# Patient Record
Sex: MALE | Race: OTHER | NOT HISPANIC OR LATINO | Employment: OTHER | URBAN - METROPOLITAN AREA
[De-identification: names, ages, dates, MRNs, and addresses within clinical notes are randomized per-mention and may not be internally consistent; named-entity substitution may affect disease eponyms.]

---

## 2023-11-06 PROBLEM — E78.5 DYSLIPIDEMIA: Status: ACTIVE | Noted: 2023-11-06

## 2023-11-06 PROBLEM — B02.29 POST HERPETIC NEURALGIA: Status: ACTIVE | Noted: 2023-11-06

## 2023-11-06 PROBLEM — I48.21 PERMANENT ATRIAL FIBRILLATION (MULTI): Status: ACTIVE | Noted: 2023-11-06

## 2023-11-06 PROBLEM — I10 HYPERTENSION: Status: ACTIVE | Noted: 2023-11-06

## 2023-11-06 PROBLEM — R52 PAIN OF LEFT SIDE OF BODY: Status: ACTIVE | Noted: 2023-11-06

## 2023-11-06 PROBLEM — R42 LIGHTHEADEDNESS: Status: ACTIVE | Noted: 2023-11-06

## 2023-11-06 PROBLEM — G25.2 ORTHOSTATIC TREMOR: Status: ACTIVE | Noted: 2023-11-06

## 2023-11-06 PROBLEM — R42 VERTIGO: Status: ACTIVE | Noted: 2023-11-06

## 2023-11-06 PROBLEM — R42 DIZZINESS: Status: ACTIVE | Noted: 2023-11-06

## 2023-11-06 RX ORDER — DULOXETIN HYDROCHLORIDE 20 MG/1
1 CAPSULE, DELAYED RELEASE ORAL NIGHTLY
COMMUNITY
Start: 2020-06-01

## 2023-11-06 RX ORDER — WARFARIN SODIUM 5 MG/1
TABLET ORAL
COMMUNITY

## 2023-11-06 RX ORDER — LOVASTATIN 20 MG/1
1 TABLET ORAL DAILY
COMMUNITY
Start: 2015-10-28

## 2023-11-06 RX ORDER — LISINOPRIL 5 MG/1
20 TABLET ORAL DAILY
COMMUNITY
Start: 2020-03-13 | End: 2023-11-13

## 2023-11-11 PROBLEM — Z79.01 ANTICOAGULANT LONG-TERM USE: Status: ACTIVE | Noted: 2023-11-11

## 2023-11-11 PROBLEM — E66.3 OVERWEIGHT WITH BODY MASS INDEX (BMI) 25.0-29.9: Status: ACTIVE | Noted: 2023-11-11

## 2023-11-13 ENCOUNTER — OFFICE VISIT (OUTPATIENT)
Dept: CARDIOLOGY | Facility: CLINIC | Age: 78
End: 2023-11-13
Payer: COMMERCIAL

## 2023-11-13 VITALS
SYSTOLIC BLOOD PRESSURE: 117 MMHG | DIASTOLIC BLOOD PRESSURE: 61 MMHG | HEIGHT: 67 IN | WEIGHT: 176 LBS | BODY MASS INDEX: 27.62 KG/M2 | HEART RATE: 53 BPM

## 2023-11-13 DIAGNOSIS — R00.1 BRADYCARDIA: ICD-10-CM

## 2023-11-13 DIAGNOSIS — I48.21 PERMANENT ATRIAL FIBRILLATION (MULTI): ICD-10-CM

## 2023-11-13 DIAGNOSIS — R06.02 SHORTNESS OF BREATH: ICD-10-CM

## 2023-11-13 DIAGNOSIS — I10 PRIMARY HYPERTENSION: ICD-10-CM

## 2023-11-13 PROCEDURE — 3078F DIAST BP <80 MM HG: CPT | Performed by: INTERNAL MEDICINE

## 2023-11-13 PROCEDURE — 3074F SYST BP LT 130 MM HG: CPT | Performed by: INTERNAL MEDICINE

## 2023-11-13 PROCEDURE — 99214 OFFICE O/P EST MOD 30 MIN: CPT | Performed by: INTERNAL MEDICINE

## 2023-11-13 PROCEDURE — 1159F MED LIST DOCD IN RCRD: CPT | Performed by: INTERNAL MEDICINE

## 2023-11-13 PROCEDURE — 1036F TOBACCO NON-USER: CPT | Performed by: INTERNAL MEDICINE

## 2023-11-13 RX ORDER — LISINOPRIL 20 MG/1
20 TABLET ORAL DAILY
COMMUNITY

## 2023-11-13 RX ORDER — LISINOPRIL 10 MG/1
10 TABLET ORAL 2 TIMES DAILY
COMMUNITY
Start: 2023-11-03 | End: 2023-11-13 | Stop reason: WASHOUT

## 2023-11-13 NOTE — PROGRESS NOTES
Patient:  Ebony Garrido  YOB: 1945  MRN: [id number]       Impression/Plan:   Primary hypertension  -    Although medication dosage has increased 20 mg of lisinopril still reasonably low-dose and blood pressure currently quite well controlled.  I do not think it likely he has secondary hypertension.  -      Occasionally as heart rate starts to slow blood pressure may increase.  It is also possible simply that his he gets older he needs a bit more medicine to control the blood pressure.  For now would continue on lisinopril as he has no adverse effects and blood pressure is controlled    Bradycardia  -     Holter or Event Cardiac Monitor; Future  -     Heart rate on exam is 45-50.  I am concerned at other times it may even be slower.  Holter monitor will be obtained to determine heart rate variability and if there is significant bradycardia occurring.  As noted above occasionally severe bradycardia can lead to elevated blood pressures    Permanent atrial fibrillation (CMS/HCC)  -     Holter or Event Cardiac Monitor; Future  -     Permanent A-fib without thromboembolic phenomenon on anticoagulation which is monitored by primary care    Shortness of breath  -     Usually just when overexerting himself Holter will help determine chronotropic competence and echo to assure no LV dysfunction  -     Transthoracic Echo (TTE) Complete; Future  -     Follow Up In Cardiology; Future      Chief Complaint/Active Symptoms:       Ebony Garrido is a 78 y.o. male who speaks only Slovenian and presents with his son Dr. Garrido, hospitalist at [facility]. Dr. Garrido translates for his father.  He was diagnosed with atrial fibrillation prior to 2014 and has been permanent since that time.  There is a reference in the chart to a TIA some 20 years ago but MRI studies since that time have not shown focal stroke or other CNS abnormalities.  Exercise stress echocardiography January 2020 showed a normal functional capacity of 7.1 METS with  normal echocardiographic and ECG response to exercise. Atrial fibrillation rate with exercise showed a normal increase as well. He has been on long-term warfarin therapy managed closely by primary care and INRs have been consistently within therapeutic range. He has had no prior history of myocardial infarction, stroke or symptoms to suggest peripheral vascular disease.  He has sick sinus syndrome based on atrial fibrillation on a relatively slow side on no negative chronotropic agents.  Also with hyperlipidemia and hypertension.    Was last in this office 5/2/2022 with some noncardiac chest wall discomfort atrial fibrillation with reasonable rate control and well-controlled blood pressure at that time.    In September he had been on vacation and developed a molar abscess in his right lower jaw.  With the increased pain he was noted to have increased blood pressure and gradually increased his lisinopril to maintain good blood pressure control.  It was getting as high as 160-180 mmHg.  He eventually increased his lisinopril to a maximum of 20 a day and has afforded good blood pressure control to date.  He has not felt chest tightness pressure heaviness.  He is not feeling short of breath.  He is fairly active he walks frequently works out in the yard and is very active most of the day without any cardiorespiratory symptoms.  He has had no nocturnal dyspnea.  No coughing.  No dizziness or lightheadedness.  Concern is that all of a sudden he seems to need a higher dose of lisinopril.  Occasionally he may get short of breath if he walks faster than usual but for the most part has been doing fairly well    Lab work done through [facility] last week demonstrates normal liver and renal function.  Normal CBC.           Review of Systems: Unremarkable except as noted above    Meds     Current Outpatient Medications   Medication Instructions    DULoxetine (Cymbalta) 20 mg DR capsule 1 capsule, oral, Nightly    lisinopril  "10 mg, oral, 2 times daily    lovastatin (Mevacor) 20 mg tablet 1 tablet, oral, Daily    warfarin (Coumadin) 5 mg tablet oral        Allergies   No Known Allergies      Annotated Problems     Specialty Problems          Cardiology Problems    Dyslipidemia    Hypertension    Permanent atrial fibrillation (CMS/HCC)    Anticoagulant long-term use        Problem List     Patient Active Problem List    Diagnosis Date Noted    Anticoagulant long-term use 11/11/2023    Overweight with body mass index (BMI) 25.0-29.9 11/11/2023    Dizziness 11/06/2023    Lightheadedness 11/06/2023    Vertigo 11/06/2023    Dyslipidemia 11/06/2023    Hypertension 11/06/2023    Pain of left side of body 11/06/2023    Permanent atrial fibrillation (CMS/HCC) 11/06/2023    Post herpetic neuralgia 11/06/2023    Orthostatic tremor 11/06/2023       Objective:     Vitals:    11/13/23 1052   BP: 117/61   BP Location: Right arm   Patient Position: Sitting   Pulse: 53   Weight: 79.8 kg (176 lb)   Height: 1.702 m (5' 7\")      Wt Readings from Last 4 Encounters:   11/13/23 79.8 kg (176 lb)   05/02/22 82.6 kg (182 lb)           LAB:     Lab Results   Component Value Date    WBC 5.4 01/30/2020    HGB 15.2 01/30/2020    HCT 41.3 01/30/2020     (L) 01/30/2020    CHOL 178 01/30/2020    TRIG 100 01/30/2020    HDL 45.0 01/30/2020    ALT 16 01/30/2020    AST 24 01/30/2020     01/30/2020    K 4.6 01/30/2020     01/30/2020    CREATININE 0.95 01/30/2020    BUN 27 (H) 01/30/2020    CO2 24 01/30/2020    INR 2.4 (H) 01/30/2020       Diagnostic Studies:     Electrocardiogram 12 Lead    Result Date: 2/13/2023  Atrial fibrillation Incomplete right bundle branch block ST & T wave abnormality, consider inferior ischemia or digitalis effect ST & T wave abnormality, consider anterior ischemia or digitalis effect Abnormal ECG No previous ECGs available Confirmed by TACOS SNELL M EIADA ([id number]) on 2/15/2020 9:10:41 PM    Electrocardiogram 12 Lead    Result Date: " 2/13/2023  Atrial fibrillation RSR' or QR pattern in V1 suggests right ventricular conduction delay ST & T wave abnormality, consider anterior ischemia or digitalis effect Abnormal ECG When compared with ECG of 30-JAN-2020 17:18, Nonspecific T wave abnormality no longer evident in Lateral leads QT has lengthened Confirmed by MICHAEL BALDERRAMA MD ([id number]) on 2/15/2020 9:10:26 PM    OUTSIDE GENERIC TESTING    Result Date: 1/31/2020  Ordered by an unspecified provider.    XR CHEST 1 VIEW    Result Date: 1/30/2020  MRN: [id number] Patient Name: GIANCARLO CORRAL  STUDY: CHEST 1 VIEW; 1/30/2020 6:01 pm  INDICATION: Chest Pain.  COMPARISON: None  ACCESSION NUMBER(S): [id number]  ORDERING CLINICIAN: TREVON ALVAREZ  FINDINGS: The cardiac silhouette size is slightly enlarged. Thoracic aorta is mildly tortuous.  No definite focal consolidation, pleural effusion or pneumothorax. There is mild interstitial prominence.  No acute osseous abnormality.  IMPRESSION: No definite focal consolidation, pleural effusion, edema or pneumothorax. Mild interstitial prominence.  The cardiac silhouette size is slightly enlarged.      ELECTROCARDIOGRAM RHYTHM STRIP    Result Date: 1/30/2020  Ordered by an unspecified provider.        Radiology:     No orders to display       Physical Exam     General Appearance: alert and oriented to person, place and time, in no acute distress  Cardiovascular: Rate 45-50 with irregular rhythm, normal S1 and S2, no murmurs, rubs, clicks, or gallops,  no JVD  Pulmonary/Chest: clear to auscultation bilaterally- no wheezes, rales or rhonchi, normal air movement, no respiratory distress  Abdomen: soft, non-tender, non-distended, normal bowel sounds, no masses   Extremities: no cyanosis, clubbing or edema  Skin: warm and dry, no rash or erythema  Eyes: EOMI  Neck: supple and non-tender without mass, no thyromegaly   Neurological: alert, oriented, normal speech, no focal findings or movement disorder noted  Vascular: 2+ pulses no  bruits

## 2023-12-01 ENCOUNTER — HOSPITAL ENCOUNTER (OUTPATIENT)
Dept: CARDIOLOGY | Facility: HOSPITAL | Age: 78
Discharge: HOME | End: 2023-12-01
Payer: COMMERCIAL

## 2023-12-01 DIAGNOSIS — R00.1 BRADYCARDIA: ICD-10-CM

## 2023-12-01 DIAGNOSIS — I48.21 PERMANENT ATRIAL FIBRILLATION (MULTI): ICD-10-CM

## 2023-12-01 DIAGNOSIS — R06.02 SHORTNESS OF BREATH: ICD-10-CM

## 2023-12-01 PROCEDURE — 93227 XTRNL ECG REC<48 HR R&I: CPT | Performed by: STUDENT IN AN ORGANIZED HEALTH CARE EDUCATION/TRAINING PROGRAM

## 2023-12-01 PROCEDURE — 93225 XTRNL ECG REC<48 HRS REC: CPT

## 2023-12-16 ENCOUNTER — TELEPHONE (OUTPATIENT)
Dept: CARDIOLOGY | Facility: CLINIC | Age: 78
End: 2023-12-16
Payer: COMMERCIAL

## 2023-12-16 NOTE — TELEPHONE ENCOUNTER
I spoke with his son  Neoandrew and reviewed that the Holter showed his known permanent atrial fibrillation the average heart rate is 53 maximum 116.  He does go in the 70s when he is active.  Every waking hour he is in the mid to upper 40s for a period of time as well.  At night he has occasional 4-second pauses and rates 25-35.  I think he needs assessed for sleep apnea and will be considered by his primary care doctor his son will be working on arranging that.  Echocardiogram is still pending.  When he is home and has monitoring heart rate is documented to be into the 70s and 80s.  At this moment there is medication for support.  Will await echo findings and hopefully assessment of sleep apnea    Mick Lyons MD

## 2024-01-12 ENCOUNTER — HOSPITAL ENCOUNTER (OUTPATIENT)
Dept: CARDIOLOGY | Facility: HOSPITAL | Age: 79
Discharge: HOME | End: 2024-01-12
Payer: COMMERCIAL

## 2024-01-12 DIAGNOSIS — I10 PRIMARY HYPERTENSION: ICD-10-CM

## 2024-01-12 DIAGNOSIS — R06.02 SHORTNESS OF BREATH: ICD-10-CM

## 2024-01-12 PROCEDURE — 93306 TTE W/DOPPLER COMPLETE: CPT

## 2024-01-12 PROCEDURE — 93306 TTE W/DOPPLER COMPLETE: CPT | Performed by: INTERNAL MEDICINE

## 2024-01-17 LAB
AORTIC VALVE PEAK VELOCITY: 1.06
AV PEAK GRADIENT: 4.5
AVA (PEAK VEL): 2.71
EJECTION FRACTION APICAL 4 CHAMBER: 42.7
EJECTION FRACTION: 49
LEFT VENTRICLE INTERNAL DIMENSION DIASTOLE: 4.4 (ref 3.5–6)
LEFT VENTRICULAR OUTFLOW TRACT DIAMETER: 2.3
MITRAL VALVE E/A RATIO: 1.34
MITRAL VALVE E/E' RATIO: 6.55
RIGHT VENTRICLE FREE WALL PEAK S': 13.2
RIGHT VENTRICLE PEAK SYSTOLIC PRESSURE: 27.2
TRICUSPID ANNULAR PLANE SYSTOLIC EXCURSION: 2.2

## 2024-01-18 ENCOUNTER — TELEPHONE (OUTPATIENT)
Dept: CARDIOLOGY | Facility: CLINIC | Age: 79
End: 2024-01-18
Payer: COMMERCIAL

## 2024-01-18 NOTE — TELEPHONE ENCOUNTER
I reviewed the echo report with Mr. Garrido son, Dr. Garrido.  Echo shows low normal ejection fraction without significant valvular disease there is mild to moderate MR only.  Patient currently is feeling quite well with blood pressure normal range and heart rate during the day and in normal range as well.  He has had no dizziness or lightheadedness.  Dr. Garrido will keep a close eye on his father who also has primary care follow-up.  He is aware of the eventual likely need of pacemaker support should symptoms or severely low heart rates occur.  At this time he does not meet criteria.  He will follow-up in this office on an as-needed basis  Mick Lyons MD

## 2024-01-23 ENCOUNTER — APPOINTMENT (OUTPATIENT)
Dept: CARDIOLOGY | Facility: CLINIC | Age: 79
End: 2024-01-23
Payer: COMMERCIAL

## 2025-04-01 ENCOUNTER — APPOINTMENT (OUTPATIENT)
Dept: CARDIOLOGY | Facility: CLINIC | Age: 80
End: 2025-04-01
Payer: COMMERCIAL

## 2025-04-01 VITALS
DIASTOLIC BLOOD PRESSURE: 66 MMHG | HEIGHT: 67 IN | SYSTOLIC BLOOD PRESSURE: 134 MMHG | BODY MASS INDEX: 28.6 KG/M2 | HEART RATE: 53 BPM | WEIGHT: 182.2 LBS

## 2025-04-01 DIAGNOSIS — I48.21 PERMANENT ATRIAL FIBRILLATION (MULTI): ICD-10-CM

## 2025-04-01 DIAGNOSIS — R00.1 BRADYCARDIA: ICD-10-CM

## 2025-04-01 PROCEDURE — 3078F DIAST BP <80 MM HG: CPT | Performed by: INTERNAL MEDICINE

## 2025-04-01 PROCEDURE — 3075F SYST BP GE 130 - 139MM HG: CPT | Performed by: INTERNAL MEDICINE

## 2025-04-01 PROCEDURE — 99214 OFFICE O/P EST MOD 30 MIN: CPT | Performed by: INTERNAL MEDICINE

## 2025-04-01 PROCEDURE — 1159F MED LIST DOCD IN RCRD: CPT | Performed by: INTERNAL MEDICINE

## 2025-04-01 NOTE — PROGRESS NOTES
Patient:  Ebony Garrido  YOB: 1945  MRN: [id number]       Impression/Plan:     Diagnoses and all orders for this visit:  Permanent atrial fibrillation (Multi)  Bradycardia  -     He is asymptomatic but heart rates when presented the office in the 50s but when I listen he was in the upper 40s on occasion.  -     He is on no negative chronotropic agents.  -     Will obtain 48-hour Holter monitor as long as no severe bradycardia or heart block continue an expectant approach.  He and a course his son no side effects and symptoms that could occur with bradycardia and he will monitor this.  Further recommendations based on result of Holter monitoring  -     Holter Or Event Cardiac Monitor; Future      Chief Complaint/Active Symptoms:      Chief Complaint   Patient presents with    Hypertension    Follow-up       Ebony Garrido is a 80 y.o. male who presents with him in atrial fibrillation since 2014 with diagnosis long before that.  He also has sick sinus syndrome based on relatively low heart rates on no negative chronotropic agents.  He has hypertension and hyperlipidemia.  He has not had history of coronary artery disease, stroke or symptoms of congestive heart failure.  There is mention in the chart of TIA over 20 years ago but subsequent MRI CT of the brain have been negative.     His son  Lynnettejayson translates for him because he is Syriac and that is his major language    I had last seen him 11/13/2023 at which time blood pressure was controlled heart rate was stable on the low side therefore Holter monitor obtained echo obtained as well to assess slight shortness of breath    12/1/23 monitor  Atrial fibrillation throughout average heart rate 53 range 23-1 16.  During sleeping hours multiple pauses of 4.4 seconds.    After that study was recommended he be evaluated for sleep apnea      1/12/2024 echocardiogram   1. Left ventricular systolic function is low normal with a 50% estimated ejection fraction.   2.  The left atrium is severely dilated.   3. The right atrium is severely dilated.   4. Mild to moderate mitral valve regurgitation.   5. Moderate tricuspid regurgitation.   6. RVSP within normal limits.   7. Aortic valve sclerosis.   8. The patient is in atrial fibrillation which may influence the estimate of left ventricular function and transvalvular flows.    2/28/2025 INR 2.0    1/23/2025 in the emergency department because of epistaxis.  Initially with silver nitrate cautery but had to come back for packing later the same day.  At that time blood pressure 143/63 with heart rate of 60.  CBC at that time was normal    He denies angina, dyspnea, palpitation, edema, lightheadedness or syncope.  He has had no symptoms of claudication or neurologic deterioration.  There have been no hospitalizations or emergency room visits since last office visit.    Overall he says he feels very well.  Elected not to have evaluation for sleep apnea as he was not interested in thinking about CPAP and has no daily symptoms to suggest severe sleep apnea and he does not think he has any at night.  He has not been dizzy or lightheaded.  He can do 3-4 flights of stairs and walks 2 to 3 miles every day.  He has no symptoms with these activities.  He has no episodes of syncope nor becoming lightheaded.    He does have chronic vertigo when he turns his head a certain way but no dizziness otherwise.  In fact when he is walking and moving around is when he feels the best.               Review of Systems: Unremarkable except as noted above    Meds     Current Outpatient Medications   Medication Instructions    DULoxetine (Cymbalta) 20 mg DR capsule 1 capsule, Nightly    lisinopril 10 mg, Daily    lovastatin (Mevacor) 20 mg tablet 1 tablet, Daily    warfarin (Coumadin) 5 mg tablet Take by mouth.        Allergies   No Known Allergies      Annotated Problems     Specialty Problems          Cardiology Problems    Dyslipidemia    Hypertension     "Permanent atrial fibrillation (Multi)    Anticoagulant long-term use    Bradycardia        Problem List     Patient Active Problem List    Diagnosis Date Noted    Bradycardia 11/13/2023    Shortness of breath 11/13/2023    Anticoagulant long-term use 11/11/2023    Overweight with body mass index (BMI) 25.0-29.9 11/11/2023    Dizziness 11/06/2023    Lightheadedness 11/06/2023    Vertigo 11/06/2023    Dyslipidemia 11/06/2023    Hypertension 11/06/2023    Pain of left side of body 11/06/2023    Permanent atrial fibrillation (Multi) 11/06/2023    Post herpetic neuralgia 11/06/2023    Orthostatic tremor 11/06/2023       Objective:     Vitals:    04/01/25 1005   BP: 134/66   BP Location: Left arm   Patient Position: Sitting   Pulse: 53   Weight: 82.6 kg (182 lb 3.2 oz)   Height: 1.702 m (5' 7\")      Wt Readings from Last 4 Encounters:   04/01/25 82.6 kg (182 lb 3.2 oz)   11/13/23 79.8 kg (176 lb)   05/02/22 82.6 kg (182 lb)           LAB:     Lab Results   Component Value Date    WBC 5.4 01/30/2020    HGB 15.2 01/30/2020    HCT 41.3 01/30/2020     (L) 01/30/2020    CHOL 178 01/30/2020    TRIG 100 01/30/2020    HDL 45.0 01/30/2020    ALT 16 01/30/2020    AST 24 01/30/2020     01/30/2020    K 4.6 01/30/2020     01/30/2020    CREATININE 0.95 01/30/2020    BUN 27 (H) 01/30/2020    CO2 24 01/30/2020    INR 2.4 (H) 01/30/2020         Physical Exam     General Appearance: alert and oriented to person, place and time, in no acute distress  Cardiovascular: Bradycardic and irregular, normal S1 and S2, no murmurs, rubs, clicks, or gallops,  no JVD  Pulmonary/Chest: clear to auscultation bilaterally- no wheezes, rales or rhonchi, normal air movement, no respiratory distress  Abdomen: soft, non-tender, non-distended, normal bowel sounds, no masses   Extremities: no cyanosis, clubbing or edema  Skin: warm and dry, no rash or erythema  Eyes: EOMI  Neck: supple and non-tender without mass, no thyromegaly "   Neurological: alert, oriented, normal speech, no focal findings or movement disorder noted  Vascular: Pulses 2+      Provider attestation-scribe documentation  Any medical record entries made by the scribe were at my discretion and personally dictated by me.  I have reviewed the chart and agree that the record accurately reflects my personal performance of the history, physical exam, discussion and plan.          Scribe Attestation  By signing my name below, I, Cheyanne Cooper LPN , Scribe   attest that this documentation has been prepared under the direction and in the presence of Dr. Mick Lyons MD,FACC.

## 2025-04-01 NOTE — PATIENT INSTRUCTIONS
Follow up 6 months    48 hour Holter to be done near future. Will call results  You will be scheduled for a heart monitor to be applied.  On the day you come in to have the monitor applied, please shower prior to coming in and do not apply any creams, lotions or powders to your chest prior to coming in on the day your monitor is scheduled to be applied.       DID YOU KNOW  We have a pharmacy here in the [facility].  They can fill all prescriptions, not just cardiac medications.  Prescriptions from other pharmacies can easily be transferred to the  pharmacy by the  pharmacist on site.   pharmacies offer FREE HOME DELIVERY on medications to anywhere in [place]. They can sync your medications. Typically prescriptions can be ready in 10 - 15 minutes. If pharmacy is unable to fill your  prescription or if cost is more than your paying now the Pharmacist can easily transfer back to your Pharmacy of choice. Pharmacy phone # [phone].     Please bring all medicines, vitamins, and herbal supplements with you in original bottles to every appointment!!!!    Prescriptions will not be filled unless you are compliant with your follow up appointments or have a follow up appointment scheduled as per instruction of your physician. Refills should be requested at the time of your visit.

## 2025-04-14 ENCOUNTER — HOSPITAL ENCOUNTER (OUTPATIENT)
Dept: CARDIOLOGY | Facility: HOSPITAL | Age: 80
Discharge: HOME | End: 2025-04-14
Payer: COMMERCIAL

## 2025-04-14 DIAGNOSIS — R00.1 BRADYCARDIA: ICD-10-CM

## 2025-04-14 DIAGNOSIS — I48.21 PERMANENT ATRIAL FIBRILLATION (MULTI): ICD-10-CM

## 2025-04-14 PROCEDURE — 93225 XTRNL ECG REC<48 HRS REC: CPT

## 2025-04-18 ENCOUNTER — TELEPHONE (OUTPATIENT)
Dept: CARDIOLOGY | Facility: HOSPITAL | Age: 80
End: 2025-04-18
Payer: COMMERCIAL

## 2025-04-18 ENCOUNTER — TELEPHONE (OUTPATIENT)
Dept: CARDIOLOGY | Facility: CLINIC | Age: 80
End: 2025-04-18

## 2025-04-18 DIAGNOSIS — I48.21 PERMANENT ATRIAL FIBRILLATION (MULTI): ICD-10-CM

## 2025-04-18 PROCEDURE — 93227 XTRNL ECG REC<48 HR R&I: CPT | Performed by: INTERNAL MEDICINE

## 2025-04-18 NOTE — TELEPHONE ENCOUNTER
Although I have not yet seen tracings [employer] monitor [employer] called to say he has had over 150 pauses some as long as 4.3 seconds.  The patient remains completely asymptomatic still able to walk 1 mile without symptoms.  Has had no dizziness or syncope.  I have referred him on to see Dr. Raymond for pacemaker implantation.  Patient's son is hospitalist Dr. Garrido and he knows to take him to the ER should he have any symptoms whatsoever.  He has chronic low heart rates with obvious progression but no symptoms at this point

## 2025-04-22 ENCOUNTER — HOSPITAL ENCOUNTER (OUTPATIENT)
Facility: HOSPITAL | Age: 80
Setting detail: OUTPATIENT SURGERY
End: 2025-04-22
Attending: INTERNAL MEDICINE | Admitting: INTERNAL MEDICINE
Payer: COMMERCIAL

## 2025-04-22 ENCOUNTER — APPOINTMENT (OUTPATIENT)
Dept: CARDIOLOGY | Facility: CLINIC | Age: 80
End: 2025-04-22
Payer: COMMERCIAL

## 2025-04-22 VITALS
HEART RATE: 61 BPM | SYSTOLIC BLOOD PRESSURE: 128 MMHG | WEIGHT: 182 LBS | HEIGHT: 67 IN | DIASTOLIC BLOOD PRESSURE: 74 MMHG | BODY MASS INDEX: 28.56 KG/M2

## 2025-04-22 DIAGNOSIS — I48.21 PERMANENT ATRIAL FIBRILLATION (MULTI): ICD-10-CM

## 2025-04-22 DIAGNOSIS — I48.21 PERMANENT ATRIAL FIBRILLATION (MULTI): Primary | ICD-10-CM

## 2025-04-22 PROCEDURE — 1036F TOBACCO NON-USER: CPT | Performed by: INTERNAL MEDICINE

## 2025-04-22 PROCEDURE — 93000 ELECTROCARDIOGRAM COMPLETE: CPT | Performed by: INTERNAL MEDICINE

## 2025-04-22 PROCEDURE — 99205 OFFICE O/P NEW HI 60 MIN: CPT | Performed by: INTERNAL MEDICINE

## 2025-04-22 PROCEDURE — 3078F DIAST BP <80 MM HG: CPT | Performed by: INTERNAL MEDICINE

## 2025-04-22 PROCEDURE — 1159F MED LIST DOCD IN RCRD: CPT | Performed by: INTERNAL MEDICINE

## 2025-04-22 PROCEDURE — 3074F SYST BP LT 130 MM HG: CPT | Performed by: INTERNAL MEDICINE

## 2025-04-22 RX ORDER — CHLORHEXIDINE GLUCONATE ORAL RINSE 1.2 MG/ML
SOLUTION DENTAL
COMMUNITY
Start: 2025-04-04

## 2025-04-22 RX ORDER — TAMSULOSIN HYDROCHLORIDE 0.4 MG/1
1 CAPSULE ORAL
COMMUNITY
Start: 2025-01-10 | End: 2025-04-22 | Stop reason: WASHOUT

## 2025-04-22 RX ORDER — TRAZODONE HYDROCHLORIDE 50 MG/1
50 TABLET ORAL NIGHTLY PRN
COMMUNITY
Start: 2025-01-10

## 2025-04-22 NOTE — PROGRESS NOTES
Referring Provider: Nuno Rivera MD  Reason for Consult: Permanent atrial fibrillation and bradycardia    History of Present Illness:      Ebony Garrido is a 80 y.o. year old male patient with a history significant for permanent atrial fibrillation, hypertension, hyperlipidemia who is referred by Dr. Easton for bradycardia.    He has had atrial fibrillation for many years, at least since before 2014.  Previous to that, he has reported sick sinus syndrome based on relatively low heart rates on no negative chronotropic agents.  His atrial fibrillation has always been slow and has not needed AV dolores agents.  More recently, he had a monitor and April 2025 which showed frequent episodes of bradycardia as well as long pauses in atrial fibrillation up to 4 seconds long.  These predominantly occurred during sleep.    He is doing well today.  He has no complaints.  He is accompanied by his son who is a hospitalist at [facility].  He denies any chest pain, lightheadedness, dizziness, syncope or presyncope.    Focused Cardiovascular Problem List:  Permanent atrial fibrillation on Coumadin.  Off AV dolores agents.  Never required AV dolores agents.  Prior TIA  Hypertension  Hyperlipidemia      Past Medical and Surgical History:  Mr. Garrido  has a past medical history of Other conditions influencing health status (07/27/2016), Personal history of other diseases of the circulatory system (07/27/2016), Personal history of other specified conditions (04/10/2018), Personal history of transient ischemic attack (TIA), and cerebral infarction without residual deficits, and Unsteadiness on feet (01/15/2020).    has no past surgical history on file.    Social History:  Social History     Tobacco Use    Smoking status: Never    Smokeless tobacco: Never   Substance Use Topics    Alcohol use: Not on file          Relevant Family History:   Family History[1]       Allergies:  RX Allergies[2]  "    Medications:  Current Outpatient Medications   Medication Instructions    chlorhexidine (Peridex) 0.12 % solution SWISH AND SPIT WITH 1/2 OUNCE BY MOUTH TWICE DAILY AS DIRECTED    DULoxetine (Cymbalta) 20 mg DR capsule 1 capsule, Nightly    lisinopril 10 mg, Daily    lovastatin (Mevacor) 20 mg tablet 1 tablet, Daily    traZODone (DESYREL) 50 mg, Nightly PRN    warfarin (Coumadin) 5 mg tablet Take by mouth.         Objective   Physical Exam:  Last Recorded Vitals:      1/30/2020     5:26 PM 1/30/2020    10:54 PM 5/2/2022     8:20 AM 11/13/2023    10:52 AM 4/1/2025    10:05 AM 4/22/2025     9:03 AM   Vitals   Systolic 148  134 117 134 128   Diastolic 89  80 61 66 74   BP Location    Right arm Left arm Left arm   Heart Rate   53 53 53 61   Temp 36.7 °C (98.1 °F)        Resp 16        Height 1.701 m (5' 6.97\") 1.701 m (5' 6.97\") 1.676 m (5' 6\") 1.702 m (5' 7\") 1.702 m (5' 7\") 1.702 m (5' 7\")   Weight (lb) 179.9 171.3 182 176 182.2 182   BMI 28.2 kg/m2 26.85 kg/m2 29.38 kg/m2 27.57 kg/m2 28.54 kg/m2 28.51 kg/m2   BSA (m2) 1.96 m2 1.92 m2 1.96 m2 1.94 m2 1.98 m2 1.98 m2   Visit Report    Report Report Report    Visit Vitals  /74 (BP Location: Left arm, Patient Position: Sitting)   Pulse 61   Ht 1.702 m (5' 7\")   Wt 82.6 kg (182 lb)   BMI 28.51 kg/m²   Smoking Status Never   BSA 1.98 m²      Gen: NAD, sitting comfortably  HEENT: NC/AT  Card: RRR, no m/r/g  Pulm: Clear to auscultation bilaterally  Ext: No LE edema  Neuro: No focal deficits    Diagnostic Results      My Independent nterpretation of Reviewed Study(s):  Prior ECGs (reviewed and my interpretation):   4/22 2025: Atrial fibrillation, right bundle branch block, heart rate 61 bpm    Cardiac Rhythm Monitors:  4/14/2025: Predominant heart rhythm atrial fibrillation/flutter 100% of the time, 75% was in slow ventricular rhythm.  Multiple pauses noted on the monitor, the longest of which was 4.3 seconds.  These usually occur during sleeping hours.  Lowest " heart rate was 22 bpm.      Echocardiography:  1/12/2024:  LV ejection fraction 50%, severe biatrial enlargement, mild to moderate mitral regurgitation  No echocardiogram results found for the past 12 months           Relevant Labs:  Lab Results   Component Value Date    CREATININE 0.95 01/30/2020    CREATININE 0.88 01/30/2020    CREATININE 0.81 04/08/2019    K 4.6 01/30/2020    K 3.9 01/30/2020    K 3.7 04/08/2019    HGB 15.2 01/30/2020    HGB 14.9 01/30/2020    HGB 14.2 04/08/2019    INR 2.4 (H) 01/30/2020    AST 24 01/30/2020    AST 17 01/30/2020    AST 16 04/08/2019    ALT 16 01/30/2020    ALT 15 01/30/2020    ALT 11 04/08/2019       Assessment/Plan   Assessment and Plan:  Ebony Garrido is a 80 y.o. year old male patient who is referred for management and evaluation of bradycardia.  He has permanent atrial fibrillation.  He has always had some evidence of AV dolores disease as he is never required AV dolores blocking agents.  However, most recent monitoring showed severe bradycardia with pauses up to 4.3 seconds at night.  He is currently asymptomatic, but there is a risk of bradycardia induced arrhythmias as well as syncope with pauses this long.  Would recommend moving forward with a pacemaker.    We discussed possible pacemaker options in detail.  This includes a traditional single-chamber transvenous pacemaker versus a leadless pacemaker.  A leadless pacemaker would provide some benefits with less risk of lead dislodgment, device infection, and long-term lead management issues.  He is agreeable to move forward with leadless pacemaker implantation. The risks of the procedure were discussed in detail, including but not limited to bleeding, infection, lead dislodgement,  pneumothorax, cardiac perforation, need for cardiac or vascular surgery, myocardial infarction, stroke, and a small chance of death. Additionally, we discussed possible long-term complications including -initiated device recalls, lead  integrity issues, and late infections. The patient understands these risks and wishes to proceed with device implantation.       # Bradycardia, AV dolores disease  #Permanent atrial fibrillation  -Plan for leadless pacemaker implantation  - Continue Coumadin.  Decrease Coumadin dose to 2.5 mg for 2 days prior to the procedure.  - Last echocardiogram done last year.  Repeat echocardiogram prior to pacemaker implantation.    Name: Ebony Garrido  MRN: [id number]  Attending: Edmund Raymond MD  Procedure: Leadless pacemaker implantation  Date desired: 4/29/2025  Diagnosis: Bradycardia  Vendor if applicable: [employer]  Expected anesthesia: RN sedation  Isolation/precautions?:  None  Other comments/med instructions: Half dose Coumadin for 2 days prior to the procedure.  Otherwise, hold all other medications on the morning of the procedure.  CBC, INR, basic metabolic panel, and type and screen ordered for prior to the procedure.         Return to Clinic:  After pacemaker implant    Thank you very much for allowing me to participate in the care of this patient. Please do not hesitate to contact me with any further questions or concerns.    Edmund Raymond MD  Clinical Cardiac Electrophysiologist, [facility] [place]    of Medicine, [facility] [employer]  Director of Atrial Fibrillation Ablation, [facility]  Director of Ventricular Arrhythmias Research, [facility]  Office Phone Number: [phone]              [1]   Family History  Problem Relation Name Age of Onset    Stroke Mother      Heart attack Father     [2] No Known Allergies

## 2025-05-02 ENCOUNTER — TELEPHONE (OUTPATIENT)
Dept: CARDIOLOGY | Facility: HOSPITAL | Age: 80
End: 2025-05-02
Payer: COMMERCIAL

## 2025-05-02 ENCOUNTER — TELEPHONE (OUTPATIENT)
Dept: CARDIOLOGY | Facility: CLINIC | Age: 80
End: 2025-05-02
Payer: COMMERCIAL

## 2025-05-02 DIAGNOSIS — I48.21 PERMANENT ATRIAL FIBRILLATION (MULTI): Primary | ICD-10-CM

## 2025-05-02 NOTE — TELEPHONE ENCOUNTER
Edmund Raymond MD to Me (Selected Message)        5/2/25  2:05 PM  FYI ordered a 7-day patch monitor, not sure if it automatically gets set up through [facility] or if someone needs to be notified      Noted.  Message forwarded to sec'y to schedule.  Hilary

## 2025-05-02 NOTE — TELEPHONE ENCOUNTER
I spoke by phone with Dr. Hema cortes in regards to his father.  Both myself and Dr. Raymond electrophysiology recommended pacemaker indication being progressive AV node disease.  Insurance company denied as most were in the evening.  I did discuss that the patient needs sleep study done and that will be pursued through the patient's primary care physician.  I have also asked Dr. Raymond as to whether we could again approach the insurance company to get reconsider pacemaker

## 2025-05-02 NOTE — TELEPHONE ENCOUNTER
Patient's son complaining of continued low heart rates at home, worried about pacemaker denial. Will re-do monitor to see if any symptom correlation with low heart rates.

## 2025-05-05 ENCOUNTER — TELEPHONE (OUTPATIENT)
Dept: CARDIOLOGY | Facility: CLINIC | Age: 80
End: 2025-05-05
Payer: COMMERCIAL

## 2025-05-05 NOTE — TELEPHONE ENCOUNTER
----- Message from Alivia Orosco MD sent at 10/10/2023  2:07 PM CDT -----  Normal mammogram  Annual screening     7 day [employer] [id number] is approved per the [facility] portal and is valid from 5/5/25-8/5/25-Reference#-[id number].

## 2025-05-13 ENCOUNTER — APPOINTMENT (OUTPATIENT)
Dept: CARDIOLOGY | Facility: CLINIC | Age: 80
End: 2025-05-13
Payer: COMMERCIAL

## 2025-05-13 DIAGNOSIS — I48.21 PERMANENT ATRIAL FIBRILLATION (MULTI): ICD-10-CM

## 2025-05-28 ENCOUNTER — HOSPITAL ENCOUNTER (OUTPATIENT)
Dept: CARDIOLOGY | Facility: CLINIC | Age: 80
Discharge: HOME | End: 2025-05-28
Payer: COMMERCIAL

## 2025-05-28 DIAGNOSIS — I48.21 PERMANENT ATRIAL FIBRILLATION (MULTI): ICD-10-CM

## 2025-05-28 PROCEDURE — 93306 TTE W/DOPPLER COMPLETE: CPT | Performed by: INTERNAL MEDICINE

## 2025-05-28 PROCEDURE — 93306 TTE W/DOPPLER COMPLETE: CPT

## 2025-05-29 LAB
AORTIC VALVE MEAN GRADIENT: 5 MMHG
AORTIC VALVE PEAK VELOCITY: 1.48 M/S
AV PEAK GRADIENT: 9 MMHG
AVA (PEAK VEL): 2.77 CM2
AVA (VTI): 2.63 CM2
EJECTION FRACTION APICAL 4 CHAMBER: 67.7
EJECTION FRACTION: 63 %
LEFT VENTRICLE INTERNAL DIMENSION DIASTOLE: 4.7 CM (ref 3.5–6)
LEFT VENTRICULAR OUTFLOW TRACT DIAMETER: 2.3 CM
LV EJECTION FRACTION BIPLANE: 66 %
MITRAL VALVE E/E' RATIO: 6.3
RIGHT VENTRICLE PEAK SYSTOLIC PRESSURE: 48 MMHG

## 2025-05-30 ENCOUNTER — TELEPHONE (OUTPATIENT)
Dept: CARDIOLOGY | Facility: CLINIC | Age: 80
End: 2025-05-30
Payer: COMMERCIAL

## 2025-05-30 NOTE — TELEPHONE ENCOUNTER
"[employer] called and LM for abnormal zio patch results. Call back number [phone], reference number [id number]. Returned call to [employer]. Patient has 5 abnormal findings:    3 episodes of symptomatic bradycardia:  5/17 at 0507: rate 33 BPM for 30 seconds, page 15 strip 4  5/18 at 0359: rate 32 BPM for 30 seconds, page 6 strip 2  5/19 at 0457: rate 30 BPM for 30 seconds, page 18 strip 10    \"Back to back pauses\":  5/18 at 0431 lasting for 6.7 seconds, page 16 strip 7    Slow a fib:  5/19 at 0527: rate 32 BPM for 30 seconds, page 18 strip 11.     Routed to Edmund Raymond MD for review.   "

## 2025-06-02 ENCOUNTER — PREP FOR PROCEDURE (OUTPATIENT)
Dept: CARDIOLOGY | Facility: CLINIC | Age: 80
End: 2025-06-02
Payer: COMMERCIAL

## 2025-06-02 DIAGNOSIS — R00.1 SYMPTOMATIC BRADYCARDIA: Primary | ICD-10-CM

## 2025-06-02 DIAGNOSIS — I49.5 SICK SINUS SYNDROME (MULTI): ICD-10-CM

## 2025-06-02 RX ORDER — CEFAZOLIN SODIUM 2 G/100ML
2 INJECTION, SOLUTION INTRAVENOUS ONCE
OUTPATIENT
Start: 2025-06-02 | End: 2025-06-02

## 2025-06-02 RX ORDER — MUPIROCIN 20 MG/G
1 OINTMENT TOPICAL ONCE
OUTPATIENT
Start: 2025-06-02 | End: 2025-06-02

## 2025-06-02 RX ORDER — SODIUM CHLORIDE 9 MG/ML
100 INJECTION, SOLUTION INTRAVENOUS CONTINUOUS
OUTPATIENT
Start: 2025-06-02 | End: 2025-06-03

## 2025-06-02 RX ORDER — CHLORHEXIDINE GLUCONATE 40 MG/ML
SOLUTION TOPICAL ONCE
OUTPATIENT
Start: 2025-06-02 | End: 2025-06-02

## 2025-06-04 PROBLEM — R00.1 SYMPTOMATIC BRADYCARDIA: Status: ACTIVE | Noted: 2025-06-02

## 2025-06-04 PROBLEM — I49.5 SICK SINUS SYNDROME (MULTI): Status: ACTIVE | Noted: 2025-06-02

## 2025-06-04 PROCEDURE — 93248 EXT ECG>7D<15D REV&INTERPJ: CPT | Performed by: INTERNAL MEDICINE

## 2025-06-04 NOTE — PROGRESS NOTES
Patient's latest monitor showing frequent pauses up to 5 seconds long, sometimes occurring during waking hours. Additionally, these pauses are now associated with symptoms. Based on this, patient has symptomatic bradycardia and will need permanent pacing. Will had to schedule for next month.

## 2025-07-08 ENCOUNTER — HOSPITAL ENCOUNTER (EMERGENCY)
Facility: HOSPITAL | Age: 80
Discharge: HOME | End: 2025-07-08
Attending: EMERGENCY MEDICINE
Payer: COMMERCIAL

## 2025-07-08 ENCOUNTER — APPOINTMENT (OUTPATIENT)
Dept: CARDIOLOGY | Facility: HOSPITAL | Age: 80
End: 2025-07-08
Payer: COMMERCIAL

## 2025-07-08 ENCOUNTER — HOSPITAL ENCOUNTER (OUTPATIENT)
Facility: HOSPITAL | Age: 80
Setting detail: OUTPATIENT SURGERY
Discharge: HOME | End: 2025-07-08
Attending: INTERNAL MEDICINE | Admitting: INTERNAL MEDICINE
Payer: COMMERCIAL

## 2025-07-08 VITALS
OXYGEN SATURATION: 98 % | BODY MASS INDEX: 27.62 KG/M2 | SYSTOLIC BLOOD PRESSURE: 177 MMHG | HEART RATE: 69 BPM | DIASTOLIC BLOOD PRESSURE: 81 MMHG | HEIGHT: 67 IN | RESPIRATION RATE: 14 BRPM | TEMPERATURE: 97.5 F | WEIGHT: 176 LBS

## 2025-07-08 DIAGNOSIS — T81.89XA PROBLEM INVOLVING SURGICAL INCISION: Primary | ICD-10-CM

## 2025-07-08 PROCEDURE — 99281 EMR DPT VST MAYX REQ PHY/QHP: CPT | Performed by: EMERGENCY MEDICINE

## 2025-07-08 ASSESSMENT — PAIN SCALES - GENERAL
PAINLEVEL_OUTOF10: 0 - NO PAIN

## 2025-07-08 ASSESSMENT — LIFESTYLE VARIABLES
HAVE PEOPLE ANNOYED YOU BY CRITICIZING YOUR DRINKING: NO
TOTAL SCORE: 0
HAVE YOU EVER FELT YOU SHOULD CUT DOWN ON YOUR DRINKING: NO
EVER HAD A DRINK FIRST THING IN THE MORNING TO STEADY YOUR NERVES TO GET RID OF A HANGOVER: NO
EVER FELT BAD OR GUILTY ABOUT YOUR DRINKING: NO

## 2025-07-08 ASSESSMENT — PAIN - FUNCTIONAL ASSESSMENT
PAIN_FUNCTIONAL_ASSESSMENT: 0-10

## 2025-07-08 ASSESSMENT — COLUMBIA-SUICIDE SEVERITY RATING SCALE - C-SSRS
2. HAVE YOU ACTUALLY HAD ANY THOUGHTS OF KILLING YOURSELF?: NO
1. IN THE PAST MONTH, HAVE YOU WISHED YOU WERE DEAD OR WISHED YOU COULD GO TO SLEEP AND NOT WAKE UP?: NO
6. HAVE YOU EVER DONE ANYTHING, STARTED TO DO ANYTHING, OR PREPARED TO DO ANYTHING TO END YOUR LIFE?: NO

## 2025-07-08 NOTE — NURSING NOTE
Discharge instructions reviewed with patient's son, discussed how to resume home medication, rt groin site care and activity restrictions as well as follow-up appointments. Son verbalized understanding. Final rt groin site check remains stable, pedal pulses palpable. IV removed. Patient discharge to home, walked out on own accord accompanied by his son,  declining w/c.  
Patient ambulated with standby assist at 1815, rt groin site remains stable.   
Patient received from EP lab into room 205 S/P Leadless Pacemaker insertion via rt femoral site. Patient A/O , speaks Icelandic but son at bedside to help interpret. Patient denies any complaints of pain and has been informed by son to lie flat for first hour, bedrest for 2 hours. Tele monitor placed: 1:1 ventricular paced at 60. Pacer rep also met with patient and son (son is a physician). Rt groin site remains stable, no ecchymosis, no hematoma. Vital signs stable. Call bell within reach.  
Spoke with Dr Sandra Garrido, pt's son, regarding pre-procedure instructions. He was unaware that the procedure was approved. I asked if tomorrow was going to work for his dad's procedure and he said yes. I reviewed time of arrival, list of meds with last dosing and insurance card/photo ID for registration and NPO after midnight. I asked if he knew if pt had his coumadin at 1/2 dose as per office notes today and yesterday. He stated he would check and that he would tell the pt to hold coumadin today to make sure and bring pt for procedure at time given.   
Yes

## 2025-07-08 NOTE — DISCHARGE INSTRUCTIONS
**Please resume Warfarin on 7/9/2025.    Home going instructions after a leadless Pacemaker Implant    After a procedure using sedation  You should return home and rest for the remainder of the day and evening.   It is recommended a responsible adult be with you for the first 24 hours after the procedure.  Do not make any legal decisions for 24 hours after your procedure.  Do not drink alcoholic beverages for 24 hours after your procedure.    Remote monitoring/ Device ID card  You have been instructed by the device company representative regarding remote home monitoring.   There are multiple types of home monitoring units, please follow the instructions given  If you have questions please contact the device clinic for further instruction  After implant you will receive a temporary card.    Permanent card will come in the mail in the next few weeks.  It is important that you carry your pacemaker ID card with you at all times.    Inform all doctors and healthcare providers that you have a pacemaker.      Electromagnetic Interference:    Microwave ovens are safe to use.    Please inform any physicians of your pacemaker implant prior to MRI for appropriate scheduling.    You should be prepared to show your pacemaker identification card to the security guards at metal detectors.    Hand wand detector should be kept away from the pacemaker.    Read the patient booklet for more information.      Call 911 or seek medical attention for:  Severe chest pain  Change in mental status or weakness in extremities.  Dizziness, light headedness, or feeling faint.  If there is a large amount of bleeding or spurting of blood.  DO NOT drive yourself to the hospital.    Activity/ After care  Avoid heavy lifting (over 10 pounds), strenuous activity/exercise, squatting or excessive bending for 7 days.  Avoid sexual activity for 3 days and until any groin discomfort has ceased.  No driving for 48 hours after procedure.    Groin site  care  The transparent dressing should be removed from the site 24 hours after the procedure.    It is ok to shower after transparent dressing is removed  Wash the site gently with soap and water.   Rinse well and pat dry.  You may apply a Band-Aid to the site.   Avoid lotions, ointments, or powders until fully healed.  No submerged bathing, swimming, or hot tubs for the next 7 days, or until fully healed.  You may take acetaminophen (Tylenol) as directed for discomfort.      Notify your provider  If you develop a large area of bruising or a large lump.  It is normal to notice a small bruise around the puncture site and/or a small lump.   If bleeding should occur, lay down and apply pressure to the affected area for 15 minutes.    If groin pain is not relieved with acetaminophen (Tylenol).  If you develop tingling, numbness, pain, or coolness in the leg/arm beyond the site where the procedure was performed.  If post procedure chest discomfort unrelieved with acetaminophen (Tylenol)    Follow up appointments  Incision (wound) check in 1 week.  Appointment for device check, and provider in 6 weeks.  These appointments will be scheduled and appear on your After Visit Summary.

## 2025-07-08 NOTE — H&P
"History Of Present Illness  Ebony Garrido is a 80 y.o. male presenting with symptomatic bradycardia.     Past Medical History  Medical History[1]    Surgical History  Surgical History[2]     Social History  He reports that he has never smoked. He has never used smokeless tobacco. He reports that he does not use drugs. No history on file for alcohol use.    Family History  Family History[3]     Allergies  Patient has no known allergies.    Review of Systems   All other systems reviewed and are negative.       Physical Exam  Vitals reviewed.   Constitutional:       Appearance: Normal appearance.   HENT:      Head: Normocephalic and atraumatic.   Cardiovascular:      Rate and Rhythm: Bradycardia present. Rhythm irregular.   Pulmonary:      Effort: Pulmonary effort is normal. No respiratory distress.   Abdominal:      Palpations: Abdomen is soft.   Skin:     General: Skin is warm and dry.   Neurological:      General: No focal deficit present.      Mental Status: He is alert. Mental status is at baseline.          Last Recorded Vitals  Blood pressure 153/85, pulse 64, temperature 36 °C (96.8 °F), temperature source Temporal, resp. rate 16, height 1.702 m (5' 7\"), weight 80.1 kg (176 lb 9.4 oz), SpO2 95%.    Relevant Results             Assessment & Plan  Symptomatic bradycardia    Sick sinus syndrome (Multi)      80-year-old with slow atrial fibrillation and symptomatic bradycardia who is here for elective leadless pacemaker placement.    I spent 20 minutes in the professional and overall care of this patient.      Edmund Raymond MD         [1]   Past Medical History:  Diagnosis Date    Hyperlipidemia     Hypertension     Other conditions influencing health status 07/27/2016    History of cough    Personal history of other diseases of the circulatory system 07/27/2016    History of cardiac murmur    Personal history of other specified conditions 04/10/2018    History of weakness    Personal history of transient ischemic " attack (TIA), and cerebral infarction without residual deficits     History of transient cerebral ischemia    Unsteadiness on feet 01/15/2020    Unsteadiness on feet   [2] History reviewed. No pertinent surgical history.  [3]   Family History  Problem Relation Name Age of Onset    Stroke Mother      Heart attack Father

## 2025-07-09 NOTE — DISCHARGE INSTRUCTIONS
There is no need for further intervention regarding the wound in your groin.  This does appear to be well-approximated, is not currently bleeding.  Please been looking for signs of worsening complications related to your procedure including active bleeding from the groin site, lightheadedness, dizziness, chest pain.  If you begin to experience these please return to the ER.

## 2025-07-09 NOTE — ED PROVIDER NOTES
EMERGENCY DEPARTMENT ENCOUNTER      Pt Name: Ebony Garrido  MRN: [id number]  Birthdate 1945  Date of evaluation: 7/8/2025  Provider: Adrian Jay DO    CHIEF COMPLAINT       Chief Complaint   Patient presents with    Wound Check     Pt had procedure through right groin today. Pt states the site was bleeding. Pt held pressure. Bleeding stopped prior to triage. Saturated dressing was removed. Site assessed and new dressing applied. +bruising noted to right groin. Incision site clean.          HISTORY OF PRESENT ILLNESS    Patient is an 80-year-old male presenting today with concern for bleeding from his groin where he had a leadless pacemaker placed today at [facility].  Started oozing earlier this evening, around 45 hours prior to arrival.  Patient is on Coumadin, unfortunately was not stopped prior to the procedure.  INR is 2.7, family is concerned about bleeding.  Patient is not lightheaded dizzy.  Does not have any shortness of breath.  No chest pain.          Nursing Notes were reviewed.    PAST MEDICAL HISTORY   Medical History[1]      SURGICAL HISTORY     Surgical History[2]      CURRENT MEDICATIONS       Previous Medications    CHLORHEXIDINE (PERIDEX) 0.12 % SOLUTION    SWISH AND SPIT WITH 1/2 OUNCE BY MOUTH TWICE DAILY AS DIRECTED    DULOXETINE (CYMBALTA) 20 MG DR CAPSULE    Take 1 capsule (20 mg) by mouth once daily at bedtime.    LISINOPRIL 20 MG TABLET    Take 0.5 tablets (10 mg) by mouth once daily.    LOVASTATIN (MEVACOR) 20 MG TABLET    Take 1 tablet (20 mg) by mouth once daily.    TRAZODONE (DESYREL) 50 MG TABLET    Take 1 tablet (50 mg) by mouth as needed at bedtime.    WARFARIN (COUMADIN) 5 MG TABLET    Take by mouth.       ALLERGIES     Patient has no known allergies.    FAMILY HISTORY     Family History[3]       SOCIAL HISTORY     Social History[4]    SCREENINGS                        PHYSICAL EXAM    (up to 7 for level 4, 8 or more for level 5)     ED Triage Vitals [07/08/25 2244]   Temperature  Heart Rate Respirations BP   36.4 °C (97.5 °F) 69 14 177/81      Pulse Ox Temp Source Heart Rate Source Patient Position   98 % Temporal Monitor Sitting      BP Location FiO2 (%)     Right arm --       Physical Exam  Vitals and nursing note reviewed.   Constitutional:       General: He is not in acute distress.     Appearance: Normal appearance. He is not ill-appearing or toxic-appearing.   HENT:      Head: Normocephalic and atraumatic.      Right Ear: External ear normal.      Left Ear: External ear normal.      Nose: Nose normal.   Eyes:      General:         Right eye: No discharge.         Left eye: No discharge.      Extraocular Movements: Extraocular movements intact.      Conjunctiva/sclera: Conjunctivae normal.      Pupils: Pupils are equal, round, and reactive to light.   Cardiovascular:      Rate and Rhythm: Normal rate and regular rhythm.      Pulses: Normal pulses.      Heart sounds: Normal heart sounds.   Pulmonary:      Effort: Pulmonary effort is normal.      Breath sounds: Normal breath sounds.   Musculoskeletal:         General: No deformity or signs of injury.   Skin:     General: Skin is warm and dry.      Comments: Postsurgical incision in the right groin is well-approximated.  There is no active bleeding.  There are surrounding ecchymosis, otherwise there is no surrounding hematoma.  Femoral pulse is normal.    Neurological:      General: No focal deficit present.      Mental Status: He is alert and oriented to person, place, and time. Mental status is at baseline.   Psychiatric:         Mood and Affect: Mood normal.         Behavior: Behavior normal.          DIAGNOSTIC RESULTS     LABS:  Labs Reviewed - No data to display    All other labs were within normal range or not returned as of this dictation.    Imaging  No orders to display        Procedures  Procedures     EMERGENCY DEPARTMENT COURSE/MDM:   Medical Decision Making  Patient is an 80-year-old male presenting today with concern for  bleeding from his groin where he had a leadless pacemaker placed today at [facility].  Started oozing earlier this evening, around 45 hours prior to arrival.  Patient is on Coumadin, unfortunately was not stopped prior to the procedure.  INR is 2.7, family is concerned about bleeding.  Patient is not lightheaded dizzy.  Does not have any shortness of breath.  No chest pain.  Incision site looks to be overall well-appearing.  There is no bleeding.  No expression of bleeding with palpation of the surrounding area.  There is ecchymosis surrounding, but no other obvious hematoma.  Vital signs are stable.  Overall no need for further intervention at this time.  Patient will be stable for discharge with follow-up with his cardiologist outpatient.         Please see ED course for additional MDM.    Diagnoses as of 07/08/25 1921   Problem involving surgical incision        No orders to display       Patient and or family in agreement and understanding of treatment plan.  All questions answered.      I reviewed the case with the attending ED physician. The attending ED physician agrees with the plan. Patient and/or patient´s representative was counseled regarding labs, imaging, likely diagnosis, and plan. All questions were answered.    ED Medications administered this visit:  Medications - No data to display    New Prescriptions from this visit:    New Prescriptions    No medications on file       Follow-up:  Edmund Raymond MD  [address], [facility] [address]  [facility] [address] [phone]    Schedule an appointment as soon as possible for a visit           Final Impression:   1. Problem involving surgical incision          (Please note that portions of this note were completed with a voice recognition program.  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  [3]   Family History  Problem Relation Name Age of Onset    Stroke Mother      Heart attack Father     [4]   Social History  Socioeconomic History    Marital status:    Tobacco Use    Smoking status: Never    Smokeless tobacco: Never   Substance and Sexual Activity    Drug use: Never    Sexual activity: Defer        Adrian Jay DO  Resident  07/08/25 9438

## 2025-07-14 ENCOUNTER — APPOINTMENT (OUTPATIENT)
Dept: CARDIOLOGY | Facility: CLINIC | Age: 80
End: 2025-07-14
Payer: COMMERCIAL

## 2025-07-14 VITALS
HEIGHT: 67 IN | WEIGHT: 176 LBS | SYSTOLIC BLOOD PRESSURE: 118 MMHG | DIASTOLIC BLOOD PRESSURE: 86 MMHG | HEART RATE: 68 BPM | BODY MASS INDEX: 27.62 KG/M2

## 2025-07-14 DIAGNOSIS — Z79.01 CHRONIC ANTICOAGULATION: ICD-10-CM

## 2025-07-14 DIAGNOSIS — Z95.0 PRESENCE OF LEADLESS CARDIAC PACEMAKER: ICD-10-CM

## 2025-07-14 DIAGNOSIS — Z48.89 ENCOUNTER FOR POSTOPERATIVE WOUND CHECK: Primary | ICD-10-CM

## 2025-07-14 DIAGNOSIS — I48.21 PERMANENT ATRIAL FIBRILLATION (MULTI): ICD-10-CM

## 2025-07-14 PROCEDURE — 1159F MED LIST DOCD IN RCRD: CPT | Performed by: PHYSICIAN ASSISTANT

## 2025-07-14 PROCEDURE — 1036F TOBACCO NON-USER: CPT | Performed by: PHYSICIAN ASSISTANT

## 2025-07-14 PROCEDURE — 3079F DIAST BP 80-89 MM HG: CPT | Performed by: PHYSICIAN ASSISTANT

## 2025-07-14 PROCEDURE — 3074F SYST BP LT 130 MM HG: CPT | Performed by: PHYSICIAN ASSISTANT

## 2025-07-14 PROCEDURE — 99024 POSTOP FOLLOW-UP VISIT: CPT | Performed by: PHYSICIAN ASSISTANT

## 2025-07-14 NOTE — PROGRESS NOTES
Electrophysiology Office Visit     CHIEF COMPLAINT  Chief Complaint   Patient presents with    Follow-up     Pt is here today following up for wound check post implant       Primary EP doctor: Dr Raymond  Primary Cardiologist: Dr Serena RHODES History: SSS/symptomatic bradycardia s/p Leadless PPM (7/8/2025), permanent AF (dx 2014?), RBBB, HTN, TIA  4/22/2025 NEW EP OV Dr Raymond, referred by Dr Easton for bradycardia. Cardiac monitor (4/2025) which showed frequent episodes of bradycardia as well as long pauses in atrial fibrillation up to 4.3 seconds long.  These predominantly occurred during sleep. Pt decides on leadless PPM.   7/8/2025 Leadless PPM for SSS/symptomatic bradycardia; Abbot Aveir VR device   Maintained on Warfarin    HPI: 7/14/2025  80 year old male pmhx SSS/symptomatic bradycardia s/p Leadless PPM (7/8/2025), permanent AF (dx 2014?), RBBB, HTN, TIA. Maintained on Warfarin.    Here for 1 week wound check s/p leadless PPM for SSS/symptomatic bradycardia w/ Dr Raymond. His daughter translates for the patient in [facility]. He returned to hospital the evening of discharge due to bleeding at the groin site. Unfortunately his INR was 2.7. The patient had applied pressure to the site and bleeding has stopped prior to triage. The saturated dressing was removed and new dressing was applied.    Today the wound site is slightly open. Some blood on a band aid. I placed 2 steri strip to gently approximate the edges to prevent further gapping and placed a band aid over the 2 steri strips. He can take this off at the end of the week.     1 week wound check of R femoral vein/groin:   Any patient's complaints? RIGHT GROIN IS SORE  Constitutional signs of infection such as fever, chills, body aches? NO  Local signs of infection such as cellulitis or drainage?  NO  Signs of hematoma?  NO  Severity of ecchymosis? LOCALIZED IN THE GROIN AND SUPRAPELVIC REGION.   Pulses present?  DIFFICULT TO PALPATE  Bruit present?   NO    VITALS  Vitals:    07/14/25 1031   BP: 118/86   Pulse: 68     Wt Readings from Last 4 Encounters:   07/14/25 79.8 kg (176 lb)   07/08/25 79.8 kg (176 lb)   07/08/25 80.1 kg (176 lb 9.4 oz)   04/22/25 82.6 kg (182 lb)     PHYSICAL EXAM:  GENERAL:  Well developed, well nourished, in no acute distress.  NEURO/PSYCH:  No focal deficits. A&O x 3     Medical History[1]  Surgical History[2]  Social History[3]  Family History[4]  RX Allergies[5]   Current Outpatient Medications   Medication Instructions    chlorhexidine (Peridex) 0.12 % solution SWISH AND SPIT WITH 1/2 OUNCE BY MOUTH TWICE DAILY AS DIRECTED    DULoxetine (Cymbalta) 20 mg DR capsule 1 capsule, Nightly    lisinopril 10 mg, Daily    lovastatin (Mevacor) 20 mg tablet 1 tablet, Daily    traZODone (DESYREL) 50 mg, Nightly PRN    warfarin (Coumadin) 5 mg tablet Take by mouth.      Relevant reports:   5/28/2025 ECHO  1. The left ventricular systolic function is normal with a visually estimated ejection fraction of 60-65%.   2. No regional wall motion abnormalities.   3. Left ventricular diastolic filling was indeterminate due to atrial fibrillation/flutter.   4. There is normal right ventricular global systolic function.   5. Mildly enlarged right ventricle.   6. The left atrial size is severely dilated.   7. The right atrial size is severely dilated.   8. Mild mitral valve regurgitation.   9. 3+ tricuspid regurgitation with estimated RVSP of 46 mmHg consistent with moderately elevated right sided pressures.  10. Moderate to severe tricuspid regurgitation.  11. Mild to moderate aortic valve regurgitation.  12. The inferior vena cava appears mildly dilated, with IVC inspiratory collapse greater than 50%.  13. Comparison study dated 1/12/24 showed an LVEF 50%. Severe LAE. Trivial to 1+ AI. Moderate MR and TR. RVSP 27 mmHg.    1/12/2024 ECHO   1. Left ventricular systolic function is low normal with a 50% estimated ejection fraction.   2. The left atrium is  severely dilated.   3. The right atrium is severely dilated.   4. Mild to moderate mitral valve regurgitation.   5. Moderate tricuspid regurgitation.   6. RVSP within normal limits.   7. Aortic valve sclerosis.   8. The patient is in atrial fibrillation which may influence the estimate of left ventricular function and transvalvular flows.    5/13/2025 7 day ZIO  Underlying Rhythm: Atrial fibrillation 100% of the time with an average heart rate of 54 bpm, minimum heart rate 27 bpm  Rare PVCs with a burden of less than 1%  Sustained Ventricular Arrhythmias: None  NSVT: 2 runs of NSVT longest lasting 4 beats  Significant Sinus Pauses or AV Block: 260 pauses the longest lasting 5 seconds while in atrial fibrillation, some occurring during waking hours.  Symptom Diary: 16 symptom events, with some correlating with pauses up to 3 seconds:.  Symptoms included shortness of breath and chest pressure.  Comments: 7-day monitor showing 100% atrial fibrillation with frequent pauses lasting up to 5 seconds.  These pauses are associated with symptoms and some occur during waking hours.    4/14/2025, 48 hr Holter  *The predominant rhythm was Atrial Fibrillation/Flutter  *The Maximum Heart Rate recorded was 106 bpm, 04/14 17:34:15, the Minimum Heart Rate recorded was 22 bpm, 04/15 05:28:56, and the Average Heart Rate was 52 bpm.  *The study included 156 Pauses. The Longest Pause was 4.3s, 04/16 02:09:07.  *There were 432 VE beats with a burden of <1 %.  *The study included an Atrial Fibrillation/Flutter [place] of 100 % with 0 % in RVR and 75 % in SVR. The longest episode was 1d 23h 59m 50.8s, 04/14 11:00:06, and the Fastest episode was 106 bpm, 04/14 11:00:06.  *Other rhythms in this study include: Ventricular Run.  *There were 0 Patient Triggers.   Patient recorded for 2 days.   Patient atrial fibrillation with rates from 22 to 106 bpm with average of 52 bpm.   Fastest heart rate occurred April 14 at 5:34 in the evening.   Longest  pause was 4.3 seconds occurring on April 16 at 02:09  in the morning; on April 15 at 05:28 in the morning the patient had a heart rate of 22 bpm, also in April 15 at 02:04 in the morning the heart rate was approximately 20 bpm in atrial fibrillation.  Multiple pauses occurring during sleeping hours.  These ranged between 3.0 and 4.3 seconds.   K[place]ia beats noted but no significant ventricular ectopy.   No adverse complaints reported    Problem List[6]     ASSESSMENT AND PLAN  Problem List Items Addressed This Visit       Permanent atrial fibrillation (Multi)  Not on anti arrhythmics or AV dolores agents (due to SSS now w/ a leadless PPM).  On coumadin.     Chronic anticoagulation for permanent A FIB  Patient has only been taking 1/2 doses of his warfarin due to concern of wound site bleeding. Told him that this is localized mild bleeding and to go back to therapeutic doses.     Encounter for postoperative wound check - Primary  Placed 2 steri strips and a band aid over the wound as it was slightly gapping and there was concern that his waist band of his pants would further irritate it.     Presence of leadless cardiac pacemaker  Placed for SSS/symptomatic bradycardia on 7/8/2025  Has appt w/ Dr Raymond on 8/20/25.        Margaret Bocanegra [facility], PAPamelaC             [1]   Past Medical History:  Diagnosis Date    Atrial fibrillation, permanent (Multi)     On warfarin    Hyperlipidemia     Hypertension     Other conditions influencing health status 07/27/2016    History of cough    Personal history of other diseases of the circulatory system 07/27/2016    History of cardiac murmur    Personal history of other specified conditions 04/10/2018    History of weakness    Personal history of transient ischemic attack (TIA), and cerebral infarction without residual deficits     History of transient cerebral ischemia    Unsteadiness on feet 01/15/2020    Unsteadiness on feet   [2]   Past Surgical History:  Procedure Laterality Date     CARDIAC ELECTROPHYSIOLOGY PROCEDURE N/A 7/8/2025    Procedure: PPM Leadless Implant;  Surgeon: Edmund Raymond MD;  Location: [facility];  Service: Electrophysiology;  Laterality: N/A;  1/2 dose Coumadin 2 days before   [3]   Social History  Tobacco Use    Smoking status: Never    Smokeless tobacco: Never   Substance Use Topics    Alcohol use: Not on file     Comment: social    Drug use: Never   [4]   Family History  Problem Relation Name Age of Onset    Stroke Mother      Heart attack Father     [5] No Known Allergies  [6]   Patient Active Problem List  Diagnosis    Dizziness    Lightheadedness    Vertigo    Dyslipidemia    Hypertension    Pain of left side of body    Permanent atrial fibrillation (Multi)    Post herpetic neuralgia    Orthostatic tremor    Chronic anticoagulation    Overweight with body mass index (BMI) 25.0-29.9    Bradycardia    Shortness of breath    Symptomatic bradycardia    Sick sinus syndrome (Multi)    Encounter for postoperative wound check    Presence of leadless cardiac pacemaker

## 2025-07-29 ENCOUNTER — APPOINTMENT (OUTPATIENT)
Dept: CARDIOLOGY | Facility: CLINIC | Age: 80
End: 2025-07-29
Payer: COMMERCIAL

## 2025-08-20 ENCOUNTER — HOSPITAL ENCOUNTER (OUTPATIENT)
Dept: CARDIOLOGY | Facility: HOSPITAL | Age: 80
Discharge: HOME | End: 2025-08-20
Payer: COMMERCIAL

## 2025-08-20 ENCOUNTER — APPOINTMENT (OUTPATIENT)
Dept: CARDIOLOGY | Facility: CLINIC | Age: 80
End: 2025-08-20
Payer: COMMERCIAL

## 2025-08-20 ENCOUNTER — TELEPHONE (OUTPATIENT)
Dept: CARDIOLOGY | Facility: CLINIC | Age: 80
End: 2025-08-20

## 2025-08-20 DIAGNOSIS — I49.5 SICK SINUS SYNDROME (MULTI): ICD-10-CM

## 2025-08-20 DIAGNOSIS — Z95.0 PRESENCE OF LEADLESS CARDIAC PACEMAKER: ICD-10-CM

## 2025-08-20 DIAGNOSIS — R00.1 SYMPTOMATIC BRADYCARDIA: Primary | ICD-10-CM

## 2025-08-20 PROCEDURE — 93279 PRGRMG DEV EVAL PM/LDLS PM: CPT

## 2025-09-09 ENCOUNTER — APPOINTMENT (OUTPATIENT)
Dept: CARDIOLOGY | Facility: CLINIC | Age: 80
End: 2025-09-09
Payer: COMMERCIAL

## 2025-09-11 ENCOUNTER — APPOINTMENT (OUTPATIENT)
Dept: CARDIOLOGY | Facility: CLINIC | Age: 80
End: 2025-09-11
Payer: COMMERCIAL

## 2026-08-19 ENCOUNTER — APPOINTMENT (OUTPATIENT)
Dept: CARDIOLOGY | Facility: CLINIC | Age: 81
End: 2026-08-19
Payer: COMMERCIAL